# Patient Record
Sex: FEMALE | URBAN - METROPOLITAN AREA
[De-identification: names, ages, dates, MRNs, and addresses within clinical notes are randomized per-mention and may not be internally consistent; named-entity substitution may affect disease eponyms.]

---

## 2023-12-13 ENCOUNTER — APPOINTMENT (RX ONLY)
Dept: URBAN - METROPOLITAN AREA CLINIC 151 | Facility: CLINIC | Age: 38
Setting detail: DERMATOLOGY
End: 2023-12-13

## 2023-12-13 DIAGNOSIS — L20.89 OTHER ATOPIC DERMATITIS: ICD-10-CM

## 2023-12-13 PROCEDURE — ? DIAGNOSIS COMMENT

## 2023-12-13 PROCEDURE — ? PRESCRIPTION

## 2023-12-13 PROCEDURE — ? GENTLE SKIN CARE INSTRUCTIONS

## 2023-12-13 PROCEDURE — ? COUNSELING

## 2023-12-13 PROCEDURE — 99204 OFFICE O/P NEW MOD 45 MIN: CPT

## 2023-12-13 RX ORDER — CLOBETASOL PROPIONATE 0.5 MG/G
OINTMENT TOPICAL
Qty: 60 | Refills: 1 | Status: ERX | COMMUNITY
Start: 2023-12-13

## 2023-12-13 RX ADMIN — CLOBETASOL PROPIONATE: 0.5 OINTMENT TOPICAL at 00:00

## 2023-12-13 ASSESSMENT — LOCATION DETAILED DESCRIPTION DERM
LOCATION DETAILED: RIGHT RADIAL DORSAL HAND
LOCATION DETAILED: LEFT RADIAL DORSAL HAND
LOCATION DETAILED: RIGHT ELBOW

## 2023-12-13 ASSESSMENT — LOCATION ZONE DERM
LOCATION ZONE: HAND
LOCATION ZONE: ARM

## 2023-12-13 ASSESSMENT — LOCATION SIMPLE DESCRIPTION DERM
LOCATION SIMPLE: RIGHT ELBOW
LOCATION SIMPLE: RIGHT HAND
LOCATION SIMPLE: LEFT HAND

## 2023-12-13 NOTE — PROCEDURE: MIPS QUALITY
Quality 130: Documentation Of Current Medications In The Medical Record: Current Medications Documented
Quality 47: Advance Care Plan: Advance care planning not documented, reason not otherwise specified.
Quality 110: Preventive Care And Screening: Influenza Immunization: Influenza Immunization Administered during Influenza season
Quality 226: Preventive Care And Screening: Tobacco Use: Screening And Cessation Intervention: Patient screened for tobacco use and is an ex/non-smoker
Detail Level: Detailed

## 2023-12-13 NOTE — HPI: OTHER
Condition:: rash on hands and R elbow
Please Describe Your Condition:: is a new patient who is being seen for a chief complaint of rash on hands and R elbow. Symptoms: dry, itchy. No hx of eczema or psoriasis. PCP gave her clotrimazole and betamethasone 1%/0.05% cream which she had used for months now with improvement and then the rash reappears. Rash has been ongoing for several years with waxing and waning flares.

## 2023-12-13 NOTE — PROCEDURE: DIAGNOSIS COMMENT
Detail Level: Detailed
Render Risk Assessment In Note?: no
Comment: Discussed natural hx and etiology. Initiate using clobetasol ointment, SE discussed. Provided sample of eucerin cream for pt to use QD. Follow up in 6-8 weeks to reassess. Reviewed gentle skin care.

## 2024-02-09 ENCOUNTER — APPOINTMENT (RX ONLY)
Dept: URBAN - METROPOLITAN AREA CLINIC 152 | Facility: CLINIC | Age: 39
Setting detail: DERMATOLOGY
End: 2024-02-09

## 2024-02-09 DIAGNOSIS — L20.89 OTHER ATOPIC DERMATITIS: ICD-10-CM

## 2024-02-09 PROCEDURE — ? DIAGNOSIS COMMENT

## 2024-02-09 PROCEDURE — ? PRESCRIPTION

## 2024-02-09 PROCEDURE — 99214 OFFICE O/P EST MOD 30 MIN: CPT

## 2024-02-09 PROCEDURE — ? GENTLE SKIN CARE INSTRUCTIONS

## 2024-02-09 PROCEDURE — ? COUNSELING

## 2024-02-09 RX ORDER — TACROLIMUS 1 MG/G
OINTMENT TOPICAL
Qty: 30 | Refills: 2 | Status: ERX | COMMUNITY
Start: 2024-02-09

## 2024-02-09 RX ORDER — CLOBETASOL PROPIONATE 0.5 MG/G
OINTMENT TOPICAL
Qty: 45 | Refills: 3 | Status: ERX

## 2024-02-09 RX ADMIN — TACROLIMUS: 1 OINTMENT TOPICAL at 00:00

## 2024-02-09 ASSESSMENT — LOCATION SIMPLE DESCRIPTION DERM
LOCATION SIMPLE: RIGHT ELBOW
LOCATION SIMPLE: LEFT HAND
LOCATION SIMPLE: RIGHT HAND

## 2024-02-09 ASSESSMENT — LOCATION DETAILED DESCRIPTION DERM
LOCATION DETAILED: LEFT RADIAL DORSAL HAND
LOCATION DETAILED: RIGHT RADIAL DORSAL HAND
LOCATION DETAILED: RIGHT ELBOW

## 2024-02-09 ASSESSMENT — LOCATION ZONE DERM
LOCATION ZONE: HAND
LOCATION ZONE: ARM

## 2024-02-09 NOTE — PROCEDURE: DIAGNOSIS COMMENT
Detail Level: Detailed
Render Risk Assessment In Note?: no
Comment: Discussed natural hx and etiology. Significantly improved w/ clobetasol ointment, but not at treatment goal. Will refill clobetasol to use BID when flared for up to 2weeks and will add on topical tacrolimus to use 3x a week for marine care, SE discussed. Provided sample of Amlactin  cream for pt to use QD. Follow up in 6months. Reviewed gentle skin care.

## 2024-08-09 ENCOUNTER — APPOINTMENT (RX ONLY)
Dept: URBAN - METROPOLITAN AREA CLINIC 152 | Facility: CLINIC | Age: 39
Setting detail: DERMATOLOGY
End: 2024-08-09

## 2024-08-09 ENCOUNTER — RX ONLY (OUTPATIENT)
Age: 39
Setting detail: RX ONLY
End: 2024-08-09

## 2024-08-09 DIAGNOSIS — L20.89 OTHER ATOPIC DERMATITIS: ICD-10-CM

## 2024-08-09 PROCEDURE — ? GENTLE SKIN CARE INSTRUCTIONS

## 2024-08-09 PROCEDURE — 99213 OFFICE O/P EST LOW 20 MIN: CPT

## 2024-08-09 PROCEDURE — ? DIAGNOSIS COMMENT

## 2024-08-09 PROCEDURE — ? PRESCRIPTION MEDICATION MANAGEMENT

## 2024-08-09 PROCEDURE — ? COUNSELING

## 2024-08-09 RX ORDER — TACROLIMUS 1 MG/G
OINTMENT TOPICAL
Qty: 30 | Refills: 3 | Status: ERX

## 2024-08-09 RX ORDER — CLOBETASOL PROPIONATE 0.5 MG/G
OINTMENT TOPICAL
Qty: 45 | Refills: 4 | Status: ERX

## 2024-08-09 ASSESSMENT — LOCATION ZONE DERM
LOCATION ZONE: ARM
LOCATION ZONE: HAND

## 2024-08-09 ASSESSMENT — LOCATION DETAILED DESCRIPTION DERM
LOCATION DETAILED: RIGHT RADIAL DORSAL HAND
LOCATION DETAILED: RIGHT ELBOW
LOCATION DETAILED: LEFT RADIAL DORSAL HAND

## 2024-08-09 ASSESSMENT — LOCATION SIMPLE DESCRIPTION DERM
LOCATION SIMPLE: LEFT HAND
LOCATION SIMPLE: RIGHT HAND
LOCATION SIMPLE: RIGHT ELBOW

## 2024-08-09 NOTE — PROCEDURE: PRESCRIPTION MEDICATION MANAGEMENT
Continue Regimen: tacrolimus 0.1 % topical ointment \\n\\nclobetasol 0.05 % topical ointment
Render In Strict Bullet Format?: No
Detail Level: Zone

## 2024-08-09 NOTE — PROCEDURE: DIAGNOSIS COMMENT
Detail Level: Detailed
Render Risk Assessment In Note?: no
Comment: Significantly improved w/ clobetasol and tacrolimus ointment! Discussed natural hx and etiology. Will refill clobetasol and tacrolimus topical ointment, benefits & SE’s discussed. Reassured pt about the differences/duration of prescriptions, steroid vs. non- steroid. Handout given.  Provided sample of CreaVe healing ointment for a moisturizer. Follow up in 1 year . Reviewed gentle skin care.

## 2024-08-09 NOTE — HPI: OTHER
Condition:: F/u Eczema
Please Describe Your Condition:: She was seen on February 9, 2024, at which time she was prescribed Clobetasol 0.05 % topical ointment and Tacrolimus 0.1 % topical ointment. She states that she notices improvement and is still uses it once needed.